# Patient Record
Sex: MALE | Race: WHITE | NOT HISPANIC OR LATINO | Employment: PART TIME | URBAN - METROPOLITAN AREA
[De-identification: names, ages, dates, MRNs, and addresses within clinical notes are randomized per-mention and may not be internally consistent; named-entity substitution may affect disease eponyms.]

---

## 2022-06-06 ENCOUNTER — OFFICE VISIT (OUTPATIENT)
Dept: ORTHOPEDICS | Facility: CLINIC | Age: 42
End: 2022-06-06
Payer: MEDICAID

## 2022-06-06 VITALS — HEIGHT: 69 IN | BODY MASS INDEX: 35.7 KG/M2 | WEIGHT: 241 LBS

## 2022-06-06 DIAGNOSIS — S83.001S: Primary | ICD-10-CM

## 2022-06-06 PROCEDURE — 1159F PR MEDICATION LIST DOCUMENTED IN MEDICAL RECORD: ICD-10-PCS | Mod: CPTII,95,, | Performed by: NURSE PRACTITIONER

## 2022-06-06 PROCEDURE — 1159F MED LIST DOCD IN RCRD: CPT | Mod: CPTII,95,, | Performed by: NURSE PRACTITIONER

## 2022-06-06 PROCEDURE — 99214 OFFICE O/P EST MOD 30 MIN: CPT | Mod: 95,,, | Performed by: NURSE PRACTITIONER

## 2022-06-06 PROCEDURE — 4010F ACE/ARB THERAPY RXD/TAKEN: CPT | Mod: CPTII,95,, | Performed by: NURSE PRACTITIONER

## 2022-06-06 PROCEDURE — 3008F PR BODY MASS INDEX (BMI) DOCUMENTED: ICD-10-PCS | Mod: CPTII,95,, | Performed by: NURSE PRACTITIONER

## 2022-06-06 PROCEDURE — 3008F BODY MASS INDEX DOCD: CPT | Mod: CPTII,95,, | Performed by: NURSE PRACTITIONER

## 2022-06-06 PROCEDURE — 1160F PR REVIEW ALL MEDS BY PRESCRIBER/CLIN PHARMACIST DOCUMENTED: ICD-10-PCS | Mod: CPTII,95,, | Performed by: NURSE PRACTITIONER

## 2022-06-06 PROCEDURE — 1160F RVW MEDS BY RX/DR IN RCRD: CPT | Mod: CPTII,95,, | Performed by: NURSE PRACTITIONER

## 2022-06-06 PROCEDURE — 4010F PR ACE/ARB THEARPY RXD/TAKEN: ICD-10-PCS | Mod: CPTII,95,, | Performed by: NURSE PRACTITIONER

## 2022-06-06 PROCEDURE — 99214 PR OFFICE/OUTPT VISIT, EST, LEVL IV, 30-39 MIN: ICD-10-PCS | Mod: 95,,, | Performed by: NURSE PRACTITIONER

## 2022-06-06 RX ORDER — ALBUTEROL SULFATE 90 UG/1
AEROSOL, METERED RESPIRATORY (INHALATION)
COMMUNITY
Start: 2021-12-26

## 2022-06-06 RX ORDER — PANTOPRAZOLE SODIUM 40 MG/1
TABLET, DELAYED RELEASE ORAL
COMMUNITY
Start: 2022-04-21

## 2022-06-06 RX ORDER — DULOXETIN HYDROCHLORIDE 30 MG/1
CAPSULE, DELAYED RELEASE ORAL
COMMUNITY
Start: 2022-04-21

## 2022-06-06 RX ORDER — FLUTICASONE PROPIONATE 50 MCG
SPRAY, SUSPENSION (ML) NASAL
COMMUNITY

## 2022-06-06 RX ORDER — AMLODIPINE BESYLATE 10 MG/1
TABLET ORAL
COMMUNITY
Start: 2022-04-21

## 2022-06-06 RX ORDER — DICLOFENAC SODIUM 10 MG/G
GEL TOPICAL
COMMUNITY

## 2022-06-06 RX ORDER — LOSARTAN POTASSIUM 50 MG/1
TABLET ORAL
COMMUNITY
Start: 2022-04-21

## 2022-06-06 NOTE — PROGRESS NOTES
"Telemedicine Consent   The patient location is: Home  The chief complaint leading to consultation is: knee pain  Visit type: Audio only per patient request due to technical issues.   Time with patient: 30 minutes  30 minutes of total time spent on the encounter, which includes face-to-face time and non-face-to-face time preparing to see the patient (eg. review of tests), obtaining and/or reviewing separately obtained history, documenting clinical information in the electronic or other health record, independently interpreting results (not separately reported) and communicating results to the patient/family/caregiver or care coordination (not separately reported).   I have reviewed patient's name,  and picture ID if applicable.  I discussed with patient regarding medical services by telemedicine (1) informed of the relationship between the physician and patient and the respective role of any other health care provider with respect to management of the patient (2) session are not recorded and personal health information is protected; and (3) notified that he or she may decline to receive medical services by telemedicine and may withdraw from such care at any time.  Patient consented to consult by telemedicine.     Subjective:       Follow up .  Patient ID: Mauricio Foss is a 42 y.o. male. Non-smoker. Employment HX: salesman, currently employed.    Seen [facility] for same DX since 22.     Chief Complaint: Pain of the Left Knee and Pain of the Right Knee    HPI:    Bilateral L < R aching and sharp medial, lateral knee pain.   Injury: deep laceration torigh knee as a child   Onset: several years ago improving   Modifying Factors: worse with activity and improves with rest  Associated Symptoms: "popping" and decreased ROM   Activity: normal activity without exercise or sports activity and pain mildly interferes with ADLs   Previous Treatments:  RX PT completed 6 wks/ 2 xs per week d/c'd n/a currently in PT " "requesting RX to continue, soft knee splint, BMI reduction ongoing education and HEP withTheraBand with significant inprovement still mild to moderate pain, was severe.   PMH: + GI bleeding  Family History: + OA    Note: Patient in RX PT currently and having significant improvement in pain, still having some pain and would like to continue in RX PT to continue with symptom reduction.     Current Outpatient Medications:     albuterol (PROVENTIL/VENTOLIN HFA) 90 mcg/actuation inhaler, SMARTSI Puff(s) By Mouth Every 4 Hours PRN, Disp: , Rfl:     amLODIPine (NORVASC) 10 MG tablet, amlodipine 10 mg tablet  Take 1 tablet every day by oral route., Disp: , Rfl:     diclofenac sodium (VOLTAREN) 1 % Gel, diclofenac 1 % topical gel  APPLY 2 GRAMS TO THE AFFECTED AREA(S) BY TOPICAL ROUTE 4 TIMES PER DAY, Disp: , Rfl:     DULoxetine (CYMBALTA) 30 MG capsule, duloxetine 30 mg capsule,delayed release  TAKE ONE CAPSULE BY MOUTH EVERY DAY, Disp: , Rfl:     fluticasone propionate (FLONASE) 50 mcg/actuation nasal spray, fluticasone propionate 50 mcg/actuation nasal spray,suspension  INHALE TWO SPRAYS IN EACH NOSTRIL EVERY DAY AS DIRECTED, Disp: , Rfl:     losartan (COZAAR) 50 MG tablet, losartan 50 mg tablet  Take 1 tablet every day by oral route for 30 days., Disp: , Rfl:     neomycin-bacitracin-polymyxin (NEOSPORIN) ointment, Triple Antibiotic 3.5 mg-400 unit-5,000 unit/gram topical ointment  Apply 1 application by topical route., Disp: , Rfl:     pantoprazole (PROTONIX) 40 MG tablet, pantoprazole 40 mg tablet,delayed release  TAKE ONE TABLET BY MOUTH EVERY DAY FOR REFLUX/HEARTBURN, Disp: , Rfl:     Review of patient's allergies indicates:  No Known Allergies    No results found for: HGBA1C   Body mass index is 35.59 kg/m².   Vitals:    22 1120 22 1121   Weight: 109.3 kg (241 lb)    Height: 5' 9" (1.753 m)    PainSc:   3   3      Review of Systems:  A ten-point review of systems was performed and is negative, " except as mentioned above       Objective:    n/a telemedicine visit  Assessment:       Imaging: X-ray dated 4/21/22, reviewed and independently interpreted by me. Discussed with patient. No acute findings .     [facility] 4/21/22 Radiology Report  XR Knee Left 4 or More Views, XR Knee Right 4 or More Views  HISTORY: Bilateral knee pain  COMPARISON:   None.  FINDINGS:  No acute displaced fractures or dislocations.  Joint spaces preserved.  No blastic or lytic lesions.  Soft tissues within normal limits.  IMPRESSION:  No acute osseous abnormality.     [facility] 4/21/22 Radiology Report  XR Knee Left 4 or More Views, XR Knee Right 4 or More Views  HISTORY: Bilateral knee pain  COMPARISON:   None.  FINDINGS:  No acute displaced fractures or dislocations.  Joint spaces preserved.  No blastic or lytic lesions.  Soft tissues within normal limits.  IMPRESSION:  No acute osseous abnormality.     MRI per referral reviewed dated 12/21/21 @ [facility]:  Right Knee: Multiloculated cystic structure posterior to the PCL consistent with ganglion. Lateral tilt of the patella in the femoral trochlea.  Left knee: No acute intraparenchymal identified, lateral tilt of the patella.    EMR Review: completed with noted PT documentation reviewed.     1. Subluxation of patella, acquired, right, sequela    2. BMI 35.0-35.9,adult      Plan:       1. Ongoing education about DX and treatment recommendations including conservative treatments of daily HEP with TheraBand, BMI reduction goal 5-10% of body weight (155# overall goal), muscle strengthening with use of stationary bike (RPM set at 80 or > with slow progression to goal of 40 minutes 3-4 times per week as tolerated), adequate vit D/C, glucosamine 1500 mg/day and daily acetaminophen 1000 mg 3 times/ day if able to tolerate.    2. 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 currently having  improvements with RX PT, will fax new RX for continued PT in order to reduce symptoms further.  F/u with PCP for continued conservative treatments.    3. Procedure: n/a  4. RX Medications: continue medications as RX per PCP  5. RTC: patient released from care due to adequate relief, f/u with PCP for continued primary care    Ree MAURER    Timed Billing Note  Total Time Spent with Patient: 30 minutes  Visit Start Time: 1030  10 minutes spent prior to exam reviewing EMR, prior labs and x-rays.  10 minutes spent in exam with patient completing exam, obtaining history, educating on DX and treatment plan.  10 minutes spent after exam completing EHR documentation.   Visit End Time: 1100

## 2022-06-22 DIAGNOSIS — M25.561 RIGHT KNEE PAIN, UNSPECIFIED CHRONICITY: ICD-10-CM

## 2022-06-22 DIAGNOSIS — M25.562 LEFT KNEE PAIN, UNSPECIFIED CHRONICITY: Primary | ICD-10-CM

## 2022-06-23 ENCOUNTER — HOSPITAL ENCOUNTER (EMERGENCY)
Facility: HOSPITAL | Age: 42
Discharge: HOME | End: 2022-06-23
Attending: FAMILY MEDICINE
Payer: MEDICAID

## 2022-06-23 VITALS
HEIGHT: 69 IN | TEMPERATURE: 98 F | WEIGHT: 252 LBS | HEART RATE: 98 BPM | BODY MASS INDEX: 37.33 KG/M2 | SYSTOLIC BLOOD PRESSURE: 157 MMHG | RESPIRATION RATE: 20 BRPM | DIASTOLIC BLOOD PRESSURE: 99 MMHG | OXYGEN SATURATION: 97 %

## 2022-06-23 DIAGNOSIS — M25.571 ACUTE RIGHT ANKLE PAIN: Primary | ICD-10-CM

## 2022-06-23 DIAGNOSIS — R52 PAIN: ICD-10-CM

## 2022-06-23 PROCEDURE — 25000003 PHARM REV CODE 250: Performed by: NURSE PRACTITIONER

## 2022-06-23 PROCEDURE — 99284 EMERGENCY DEPT VISIT MOD MDM: CPT | Mod: 25

## 2022-06-23 RX ORDER — HYDROCODONE BITARTRATE AND ACETAMINOPHEN 5; 325 MG/1; MG/1
1 TABLET ORAL EVERY 6 HOURS PRN
Qty: 12 TABLET | Refills: 0 | Status: SHIPPED | OUTPATIENT
Start: 2022-06-23

## 2022-06-23 RX ORDER — IBUPROFEN 600 MG/1
600 TABLET ORAL EVERY 8 HOURS PRN
Qty: 15 TABLET | Refills: 0 | Status: SHIPPED | OUTPATIENT
Start: 2022-06-23

## 2022-06-23 RX ORDER — HYDROCODONE BITARTRATE AND ACETAMINOPHEN 5; 325 MG/1; MG/1
1 TABLET ORAL
Status: COMPLETED | OUTPATIENT
Start: 2022-06-23 | End: 2022-06-23

## 2022-06-23 RX ADMIN — HYDROCODONE BITARTRATE AND ACETAMINOPHEN 1 TABLET: 5; 325 TABLET ORAL at 07:06

## 2022-06-23 NOTE — Clinical Note
"Mauricio Ashley" Pipo was seen and treated in our emergency department on 6/23/2022.  He may return to work on 06/27/2022.       If you have any questions or concerns, please don't hesitate to call.      Chloe MEYERS RN    "

## 2022-06-23 NOTE — ED NOTES
Pt ambulated to ed bed w/a stiff but steady gait, ROM slightly decreased d/t pain/mild lateral rt ankle swelling

## 2022-06-23 NOTE — ED PROVIDER NOTES
Encounter Date: 6/23/2022       History     Chief Complaint   Patient presents with    Ankle Pain     Pt states twisted his right ankle on Sunday.     Patient states right ankle and right foot pain after he twisted his right ankle while walking his dog x 5 days ago.         Review of patient's allergies indicates:  No Known Allergies  Past Medical History:   Diagnosis Date    Depression     GERD (gastroesophageal reflux disease)     Hypertension      No past surgical history on file.  No family history on file.  Social History     Tobacco Use    Smoking status: Never Smoker    Smokeless tobacco: Never Used     Review of Systems   Constitutional: Negative.  Negative for chills and fever.   HENT: Negative.    Eyes: Negative.    Respiratory: Negative.    Cardiovascular: Negative.    Gastrointestinal: Negative.    Endocrine: Negative.    Genitourinary: Negative.    Musculoskeletal: Negative.         Ankle pain   Skin: Negative.  Negative for rash and wound.   Allergic/Immunologic: Negative.    Neurological: Negative.    Hematological: Negative.    Psychiatric/Behavioral: Negative.    All other systems reviewed and are negative.      Physical Exam     Initial Vitals [06/23/22 1801]   BP Pulse Resp Temp SpO2   (!) 157/99 98 18 97.9 °F (36.6 °C) 97 %      MAP       --         Physical Exam    Nursing note and vitals reviewed.  Constitutional: He appears well-developed and well-nourished. No distress.   HENT:   Head: Normocephalic and atraumatic.   Mouth/Throat: Oropharynx is clear and moist.   Eyes: Conjunctivae and EOM are normal. Pupils are equal, round, and reactive to light.   Neck: Neck supple.   Normal range of motion.  Cardiovascular: Normal rate, regular rhythm, normal heart sounds and intact distal pulses.   Pulmonary/Chest: Breath sounds normal. No respiratory distress.   Musculoskeletal:         General: No edema. Normal range of motion.      Cervical back: Normal range of motion and neck supple.       Right ankle: No swelling or deformity. Tenderness present over the lateral malleolus. Normal range of motion. Normal pulse.      Left ankle: Normal.      Right foot: Normal range of motion. Tenderness (tenderness to palpation of right lateral foot) present. No swelling or bony tenderness. Normal pulse.      Left foot: Normal.     Neurological: He is alert and oriented to person, place, and time. He has normal strength. GCS score is 15. GCS eye subscore is 4. GCS verbal subscore is 5. GCS motor subscore is 6.   Skin: Skin is warm and dry. No rash noted.   Psychiatric: He has a normal mood and affect. Thought content normal.         ED Course   Procedures  Labs Reviewed - No data to display       Imaging Results          X-Ray Foot Complete Right (Final result)  Result time 06/23/22 19:01:25    Final result by Mary Ann Mendez MD (06/23/22 19:01:25)                 Impression:      No acute osseous abnormality.      Electronically signed by: Mary Ann Mendez  Date:    06/23/2022  Time:    19:01             Narrative:    EXAMINATION:  XR ANKLE COMPLETE 3 VIEW RIGHT; XR FOOT COMPLETE 3 VIEW RIGHT    CLINICAL HISTORY:  Pain, unspecified    TECHNIQUE:  AP, lateral, and oblique images of the right ankle were performed.  DP, lateral and oblique views of the right foot were obtained.    COMPARISON:  None.    FINDINGS:  No fracture. No dislocation. Ankle mortise is normal.    Mild lateral soft tissue swelling.                               X-Ray Ankle Complete Right (Final result)  Result time 06/23/22 19:01:25    Final result by Mary Ann Mendez MD (06/23/22 19:01:25)                 Impression:      No acute osseous abnormality.      Electronically signed by: Mary Ann Mendez  Date:    06/23/2022  Time:    19:01             Narrative:    EXAMINATION:  XR ANKLE COMPLETE 3 VIEW RIGHT; XR FOOT COMPLETE 3 VIEW RIGHT    CLINICAL HISTORY:  Pain, unspecified    TECHNIQUE:  AP, lateral, and oblique images of the right ankle  were performed.  DP, lateral and oblique views of the right foot were obtained.    COMPARISON:  None.    FINDINGS:  No fracture. No dislocation. Ankle mortise is normal.    Mild lateral soft tissue swelling.                                 Medications   HYDROcodone-acetaminophen 5-325 mg per tablet 1 tablet (1 tablet Oral Given 6/23/22 1931)     Medical Decision Making:   Initial Assessment:   Patient is awake, alert, and neurovascular intact in the ED.   Differential Diagnosis:   Fracture, sprain, strain  Clinical Tests:   Radiological Study: Ordered and Reviewed  ED Management:  X-rays of right ankle and right foot are negative. Patient placed in an ace wrap per nursing.              ED Course as of 06/23/22 1936   Thu Jun 23, 2022 1904 X-rays of right ankle and right foot are negative. [AB]      ED Course User Index  [AB] ERASTO Kumar             Clinical Impression:   Final diagnoses:  [R52] Pain  [M25.571] Acute right ankle pain (Primary)          ED Disposition Condition    Discharge Stable        ED Prescriptions     Medication Sig Dispense Start Date End Date Auth. Provider    ibuprofen (ADVIL,MOTRIN) 600 MG tablet Take 1 tablet (600 mg total) by mouth every 8 (eight) hours as needed for Pain. 15 tablet 6/23/2022  ERASTO Kumar    HYDROcodone-acetaminophen (NORCO) 5-325 mg per tablet Take 1 tablet by mouth every 6 (six) hours as needed for Pain. 12 tablet 6/23/2022  ERASTO Kumar        Follow-up Information     Follow up With Specialties Details Why Contact Info    Primary Care Provider  In 2 days      Harley Stiles Jr., MD Orthopedic Surgery  As needed [address].  [address]  [facility] [id number] [phone]             ERASTO Kumar  06/23/22 1936